# Patient Record
Sex: FEMALE
[De-identification: names, ages, dates, MRNs, and addresses within clinical notes are randomized per-mention and may not be internally consistent; named-entity substitution may affect disease eponyms.]

---

## 2021-08-03 PROBLEM — Z00.00 ENCOUNTER FOR PREVENTIVE HEALTH EXAMINATION: Status: ACTIVE | Noted: 2021-08-03

## 2021-08-05 ENCOUNTER — APPOINTMENT (OUTPATIENT)
Dept: PULMONOLOGY | Facility: CLINIC | Age: 31
End: 2021-08-05
Payer: COMMERCIAL

## 2021-08-05 VITALS
OXYGEN SATURATION: 98 % | WEIGHT: 128 LBS | TEMPERATURE: 99.1 F | HEIGHT: 58 IN | SYSTOLIC BLOOD PRESSURE: 118 MMHG | BODY MASS INDEX: 26.87 KG/M2 | HEART RATE: 80 BPM | DIASTOLIC BLOOD PRESSURE: 64 MMHG

## 2021-08-05 DIAGNOSIS — R06.02 SHORTNESS OF BREATH: ICD-10-CM

## 2021-08-05 PROCEDURE — 94729 DIFFUSING CAPACITY: CPT

## 2021-08-05 PROCEDURE — 94727 GAS DIL/WSHOT DETER LNG VOL: CPT

## 2021-08-05 PROCEDURE — 99204 OFFICE O/P NEW MOD 45 MIN: CPT | Mod: 25

## 2021-08-05 PROCEDURE — 94060 EVALUATION OF WHEEZING: CPT

## 2021-08-05 RX ORDER — ALBUTEROL SULFATE 90 UG/1
108 (90 BASE) AEROSOL, METERED RESPIRATORY (INHALATION)
Refills: 0 | Status: ACTIVE | COMMUNITY

## 2021-08-05 NOTE — PROCEDURE
[FreeTextEntry1] : PFT 8/5/21: Spirometry was normal. Mild air trapping. Diffusion capacity was normal. No significant improvement after bronchodilator.

## 2021-08-05 NOTE — PHYSICAL EXAM
[No Acute Distress] : no acute distress [No Neck Mass] : no neck mass [Normal S1, S2] : normal s1, s2 [Clear to Auscultation Bilaterally] : clear to auscultation bilaterally [No HSM] : no hsm [Normal Gait] : normal gait [No Clubbing] : no clubbing [No Cyanosis] : no cyanosis [No Edema] : no edema [Normal Color/ Pigmentation] : normal color/ pigmentation [Normal Turgor] : normal turgor [Oriented x3] : oriented x3 [Normal Affect] : normal affect

## 2021-08-05 NOTE — REVIEW OF SYSTEMS
[Dyspnea] : dyspnea [Fever] : no fever [Chills] : no chills [Chest Tightness] : no chest tightness [Wheezing] : no wheezing [Chest Discomfort] : no chest discomfort [Nasal Discharge] : no nasal discharge [GERD] : no gerd [Myalgias] : no myalgias [Rash] : no rash [Anemia] : no anemia [Anxiety] : no anxiety [Diabetes] : no diabetes [Thyroid Problem] : no thyroid problem

## 2021-08-05 NOTE — DISCUSSION/SUMMARY
[FreeTextEntry1] : She is a 30 year old woman with no significant past medical history who presented with shortness of breath. Was exposed to zinc phosphide in her apartment which was used for a rat infestation. Was seen in the ER on 7/26/21 and was sent home on albuterol as needed. No prior history of pulmonary disease. She never smoked. The occupational history was unremarkable. Has a pet dog. \par \par The history is suggestive of reactive airways disease as a consequence of her exposure history. The physical examination was unremarkable. Spirometry was normal. \par \par Advised to continue with albuterol as needed. Proper inhaler technique demonstrated to the patient. \par \par Will see again as needed. \par \par

## 2021-08-05 NOTE — HISTORY OF PRESENT ILLNESS
[Current] : current [TextBox_4] : She is a 30 year old woman with no significant past medical history who presented with shortness of breath. Was exposed to zinc phosphide in her apartment which was used for a rat infestation. Was seen in the ER on 7/26/21 and was sent home on albuterol as needed. \par \par No prior history of pulmonary disease. She never smoked. The occupational history was unremarkable. Has a pet dog. \par \par

## 2021-09-02 ENCOUNTER — APPOINTMENT (OUTPATIENT)
Dept: PULMONOLOGY | Facility: CLINIC | Age: 31
End: 2021-09-02

## 2024-04-10 ENCOUNTER — APPOINTMENT (OUTPATIENT)
Dept: OBGYN | Facility: CLINIC | Age: 34
End: 2024-04-10